# Patient Record
Sex: FEMALE | Race: WHITE | Employment: UNEMPLOYED | URBAN - METROPOLITAN AREA
[De-identification: names, ages, dates, MRNs, and addresses within clinical notes are randomized per-mention and may not be internally consistent; named-entity substitution may affect disease eponyms.]

---

## 2017-08-16 ENCOUNTER — OFFICE VISIT (OUTPATIENT)
Dept: INTERNAL MEDICINE CLINIC | Age: 22
End: 2017-08-16

## 2017-08-16 VITALS
WEIGHT: 134 LBS | HEIGHT: 67 IN | OXYGEN SATURATION: 99 % | RESPIRATION RATE: 16 BRPM | SYSTOLIC BLOOD PRESSURE: 115 MMHG | DIASTOLIC BLOOD PRESSURE: 75 MMHG | BODY MASS INDEX: 21.03 KG/M2 | HEART RATE: 75 BPM

## 2017-08-16 DIAGNOSIS — M76.892 HAMSTRING TENDONITIS OF LEFT THIGH: Primary | ICD-10-CM

## 2017-08-16 NOTE — MR AVS SNAPSHOT
Visit Information Date & Time Provider Department Dept. Phone Encounter #  
 8/16/2017  8:45 AM MD [facility] [place] [address] [id number] Follow-up Instructions Return in about 4 weeks (around 9/13/2017) for hamstring tendonitis. Upcoming Health Maintenance Date Due  
 HPV AGE 9Y-34Y (1 of 3 - Female 3 Dose Series) 9/21/2004 DTaP/Tdap/Td series (1 - Tdap) 9/21/2014 PAP AKA CERVICAL CYTOLOGY 9/21/2014 INFLUENZA AGE 9 TO ADULT 8/1/2017 Allergies as of 8/16/2017  Review Complete On: 8/16/2017 By: Sky Hanks MD  
  
 Severity Noted Reaction Type Reactions Amoxicillin  03/17/2016    Other (comments) As a baby Ceclor [Cefaclor]  03/17/2016    Other (comments) As a baby Penicillins  03/17/2016    Other (comments) As a baby Sulfa (Sulfonamide Antibiotics)  03/17/2016    Other (comments) As a baby Current Immunizations  Never Reviewed No immunizations on file. Not reviewed this visit You Were Diagnosed With   
  
 Codes Comments Hamstring tendonitis of left thigh    -  Primary ICD-10-CM: P80.778 ICD-9-CM: 727.09 Vitals BP Pulse Resp Height(growth percentile) Weight(growth percentile) SpO2  
 115/75 75 16 5' 7\" (1.702 m) 134 lb (60.8 kg) 99% BMI  
  
  
  
  
 20.99 kg/m2 Vitals History BMI and BSA Data Body Mass Index Body Surface Area  
 20.99 kg/m 2 1.7 m 2 Preferred Pharmacy Pharmacy Name Phone [facility] [address] [place] [address] [address] [phone] Your Updated Medication List  
  
Notice  As of 8/16/2017  9:48 AM  
 You have not been prescribed any medications. Follow-up Instructions Return in about 4 weeks (around 9/13/2017) for hamstring tendonitis. Introducing [facility]!    
 Ian Fraire introduces [employer] patient portal. Now you can access parts of your medical record, email your doctor's office, and request medication refills online. 1. In your internet browser, go to [email or web address] 2. Click on the First Time User? Click Here link in the Sign In box. You will see the New Member Sign Up page. 3. Enter your Onfan Access Code exactly as it appears below. You will not need to use this code after youve completed the sign-up process. If you do not sign up before the expiration date, you must request a new code. · Onfan Access Code: [email or web address] Expires: 11/14/2017  9:48 AM 
 
4. Enter the last four digits of your Social Security Number (xxxx) and Date of Birth (mm/dd/yyyy) as indicated and click Submit. You will be taken to the next sign-up page. 5. Create a Onfan ID. This will be your Onfan login ID and cannot be changed, so think of one that is secure and easy to remember. 6. Create a Onfan password. You can change your password at any time. 7. Enter your Password Reset Question and Answer. This can be used at a later time if you forget your password. 8. Enter your e-mail address. You will receive e-mail notification when new information is available in [address] 9. Click Sign Up. You can now view and download portions of your medical record. 10. Click the Download Summary menu link to download a portable copy of your medical information. If you have questions, please visit the Frequently Asked Questions section of the Onfan website. Remember, Onfan is NOT to be used for urgent needs. For medical emergencies, dial 911. Now available from your iPhone and Android! Please provide this summary of care documentation to your next provider. Your primary care clinician is listed as Scott Merino. If you have any questions after today's visit, please call [phone].

## 2017-08-16 NOTE — PROGRESS NOTES
Chief Complaint   Patient presents with    Leg Pain     Left Hamstring    Knee Pain     Left       she is a 19y.o. year old female who presents for evaluation of left hamstring and left knee pain. Would like to discuss injection. Performing squats at the gym with heavy weights, no pain or soreness during exercises, soreness begin the following day   Pain Assessment Encounter      Shady Carlos  8/16/2017  Onset of Symptoms: Two Weeks Ago  ________________________________________________________________________  Description: Soreness located Posterior Thigh    Frequency: 4-5 times a day  Pain Scale:(1-10): 0   Trauma Hx: None  Hx of similar symptoms: Yes  Radiation: knee  Duration:  continuous    Progression: is unchanged  What makes it better?: rest and massage  What makes it worse?:exercise, standing, stretching and walking  Medications tried: none, has tried physical therapy and home exercises in the past   Reviewed and agree with Nurse Note and duplicated in this note. Reviewed PmHx, RxHx, FmHx, SocHx, AllgHx and updated and dated in the chart. No family history on file. No past medical history on file.    Social History     Social History    Marital status: SINGLE     Spouse name: N/A    Number of children: N/A    Years of education: N/A     Social History Main Topics    Smoking status: Not on file    Smokeless tobacco: Not on file    Alcohol use Not on file    Drug use: Not on file    Sexual activity: Not on file     Other Topics Concern    Not on file     Social History Narrative        Review of Systems - negative except as listed above      Objective:     Vitals:    08/16/17 0905   BP: 115/75   Pulse: 75   Resp: 16   SpO2: 99%   Weight: 134 lb (60.8 kg)   Height: 5' 7\" (1.702 m)       Physical Examination: General appearance - alert, well appearing, and in no distress  Back exam - full range of motion, no tenderness, palpable spasm or pain on motion  Neurological - alert, oriented, normal speech, no focal findings or movement disorder noted  Musculoskeletal - Left Hamstring -pain with palpation distal to the origin of the hamstring tendon, no defect palpable, strength 5 out of 5 with flexion  Extremities - peripheral pulses normal, no pedal edema, no clubbing or cyanosis  Skin - normal coloration and turgor, no rashes, no suspicious skin lesions noted  Time Out taken at:  9:49 AM  8/16/2017    * Patient was identified by name and date of birth   * Agreement on procedure being performed was verified  * Risks and Benefits explained to the patient  * Procedure site verified and marked as necessary  * Patient was positioned for comfort  * Consent was signed and verified   In the presence of: Witness: JAVI Jay  Injection #: 1  Needle:  25 gauge  Procedure: This procedure was discussed with Frank Flanagan and other therapeutic options were considered (risks vs benefits). Frank Flanagan and I thought that an injection was merited. After informed consent was obtained, landmarks were identified(marked), and the left tendon  was cleansed with ChlorPrep in the standard sterile manner. 2mL  1% lidocaine  and  1mL D50  was then injected and needle tenotomy was not performed. Procedure performed with ultrasound needle guidance. The needle was then withdrawn. T he procedure was well tolerated. The patient is asked to continue to rest the area for a few more days before resuming regular activities. It may be more painful for the first 1-2 days. NSAIDS are to be avoided. Watch for fever, or increased swelling or persistent pain in the joint. Call or return to clinic prn if such symptoms occur or there is failure to improve as anticipated. The procedure did provide relief of symptoms in the clinic. RTC in 4 weeks for reevaluation and possible reinjection.      Given the patient's body habitus and the anatomically deep nature of this structure, sonographic guidance is recommended to prevent injury to neurovascular structures and confirm accuracy of injection. Furthermore, this patient has failed conservative treatment with physical therapy and modalities and the diagnostic and therapeutic accuracy is important. Assessment/ Plan:   Diagnoses and all orders for this visit:    1. Hamstring tendonitis of left thigh  -     20550 - INJECT TENDON SHEATH/LIGAMENT  -     AMB POC US, SONO GUIDE NEEDLE         Pathophysiology, recovery and rehabilitation process discussed and questions answered   Counseling for 30 Minutes of the total visit duration   Pictures and figures used as necessary   Provided reassurance   Monitor response to injection   Recommend activity modification   Recommend  lower impact activities-walking, Eliptical, Nordic Track, cycling or swimming   Follow up in 4 week(s)           I have discussed the diagnosis with the patient and the intended plan as seen in the above orders. The patient has received an after-visit summary and questions were answered concerning future plans. Medication Side Effects and Warnings were discussed with patient: yes  Patient Labs were reviewed and or requested: yes  Patient Past Records were reviewed and or requested  yes  I have discussed the diagnosis with the patient and the intended plan as seen in the above orders. The patient has received an after-visit summary and questions were answered concerning future plans. Pt agrees to call or return to clinic and/or go to closest ER with any worsening of symptoms. This may include, but not limited to increased fever (>100.4) with NSAIDS or Tylenol, increased edema, confusion, rash, worsening of presenting symptoms. 1) Remember to stay active and/or exercise regularly (I suggest 30-45 minutes daily)   2) For reliable dietary information, go to www. EATRIGHT.org. You may wish to consider seeing the nutritionist at [facility] at #[phone] or [phone], also consider the [address] diet.   3) I routinely suggest a complete physical exam once each year (your birth month)

## 2017-12-13 ENCOUNTER — OFFICE VISIT (OUTPATIENT)
Dept: INTERNAL MEDICINE CLINIC | Age: 22
End: 2017-12-13

## 2017-12-13 VITALS
TEMPERATURE: 97.5 F | HEART RATE: 74 BPM | OXYGEN SATURATION: 97 % | DIASTOLIC BLOOD PRESSURE: 73 MMHG | HEIGHT: 67 IN | BODY MASS INDEX: 21.22 KG/M2 | WEIGHT: 135.2 LBS | SYSTOLIC BLOOD PRESSURE: 110 MMHG

## 2017-12-13 DIAGNOSIS — Z71.84 TRAVEL ADVICE ENCOUNTER: Primary | ICD-10-CM

## 2017-12-13 RX ORDER — AZITHROMYCIN 250 MG/1
TABLET, FILM COATED ORAL
Qty: 6 TAB | Refills: 0 | Status: SHIPPED | OUTPATIENT
Start: 2017-12-13 | End: 2017-12-18

## 2017-12-13 RX ORDER — ATOVAQUONE AND PROGUANIL HYDROCHLORIDE 250; 100 MG/1; MG/1
TABLET, FILM COATED ORAL
Qty: 30 TAB | Refills: 0 | Status: SHIPPED | OUTPATIENT
Start: 2017-12-13

## 2017-12-13 NOTE — PATIENT INSTRUCTIONS
Learning About Healthy Travel Abroad  How can you stay healthy on your trip? The best way to stay healthy on your trip is to plan ahead. Talk with your doctor several months before you travel to another country. It's important to allow enough time to get the vaccine doses that you need. For example, if you need the hepatitis A vaccine, you'll need 2 doses spaced at least 6 months apart. Also ask your doctor if there are medicines or extra safety steps that you should take. Check with your local health department or travel health clinic for other travel tips. What can you do to prevent health problems? Get needed vaccines  · Make sure you are up to date with your routine shots. They can protect you from diseases such as polio, diphtheria, and measles. These diseases are still a problem in some developing countries. · Get other vaccines you need. Your doctor or a health clinic can tell you which ones you need for your travels. Here are some examples:  ¨ Hepatitis A vaccine, if you travel to developing countries. ¨ Yellow fever vaccine, if you visit places in [place] and [place] where the disease is active. ¨ Typhoid fever vaccine, if you travel to [facility] and [place], [place], or some areas of [place]. Bring medicines with you  · If you take medicines, bring a supply that will last the length of your trip. Get a letter from your doctor that lists your medical conditions and the medicines you take. Bring prescriptions for refills if you will be gone for a long time. Also bring any medical supplies you may need such as blood sugar testing supplies or insulin needles. · If you are going to an area where malaria is a risk, ask your doctor or health clinic for a prescription to help prevent infection. This medicine works best if you take it before, during, and after your trip. · You may want to bring medicine for traveler's diarrhea.  Over-the-counter medicines include:  ¨ Bismuth subsalicylate (Pepto-Bismol). ¨ Loperamide (Imodium). Your doctor may also prescribe an antibiotic to take with you. This can treat diarrhea if you're going to an area where modern medical care isn't readily available. Make safer choices as you travel  · Practice safer sex. Using condoms can prevent sexually transmitted infections. · In malaria-infected areas, use DEET insect repellent. Wear long pants and long-sleeved shirts, especially from dusk to [place]. Use mosquito netting to protect yourself from bites while you sleep. · Many developing countries don't have safe tap water. Only have drinks made with boiled water, such as tea and coffee. Canned or bottled carbonated drinks, such as soda, beer, wine, or water, are usually safe. Don't use ice if you don't know what kind of water was used to make it. And don't use tap water to brush your teeth. · Be aware that you could be injured in cars, boats, or public transportation. Driving can be dangerous due to bad roads, poor  training, and crowded roadways. If you hire a  or taxi, ask the  to slow down or drive more carefully if you feel unsafe. · Air pollution in some large cities can be a problem if you have asthma or other breathing problems. Avoid those cities when air quality is poor. Or stay indoors as much as possible. · Be careful around dogs and other animals. Dogs in developing countries are often not tame and may bite. Rabies is more common in tropical and subtropical regions. · If you're going to a place that's much higher above sea level than you're used to, ask your doctor how to avoid altitude sickness. He or she may also prescribe medicine to help treat it. Where can you get the best information? · Use the Internet to find travel health information. Try these websites:  ¨ www.cdc.gov/travel. This website is for the Centers for Disease Control and Prevention (CDC). ¨ www.who.int/ith/en.  This website lists information from the [address] [facility] Organization (WHO) on travel, required immunizations, and disease outbreaks. · Find out where you can get the best medical care in the region you are visiting. See the [address] website at www.Toonimo.gov. It lists every [place] embassy worldwide. It also lists some doctors and medical facilities in those countries. · Take along the phone numbers and addresses of embassies in the areas you will visit. They can help you find a doctor or hospital. Find out if your insurance company will cover you. You may want to get special travel health insurance. · If you are taking a cruise, you can find your ship's health record on this website: www.cdc.gov/nceh/vsp. Where can you learn more? Go to http://robert-umang.info/. Enter R129 in the search box to learn more about \"Learning About Healthy Travel Abroad. \"  Current as of: July 29, 2016  Content Version: 11.4  © 3343-4251 Healthwise, Incorporated. Care instructions adapted under license by [employer] (which disclaims liability or warranty for this information). If you have questions about a medical condition or this instruction, always ask your healthcare professional. [address] any warranty or liability for your use of this information.

## 2017-12-13 NOTE — PROGRESS NOTES
Chief Complaint   Patient presents with    Immunization/Injection     pt states she is going to [facility] in 1/2018 and needs some vaccinations, but unsure of which kind. she is a 23y.o. year old female who presents for evaluation of vaccinations, pt states she is going to [facility] in January 2018 and needs vaccinations but unsure of which ones. Patient is going from dates January 9-23. She is unaware of her vaccination history and we are unable to obtain it today. Reviewed and agree with Nurse Note and duplicated in this note. Reviewed PmHx, RxHx, FmHx, SocHx, AllgHx and updated and dated in the chart. No family history on file. No past medical history on file.    Social History     Social History    Marital status: SINGLE     Spouse name: N/A    Number of children: N/A    Years of education: N/A     Social History Main Topics    Smoking status: Not on file    Smokeless tobacco: Not on file    Alcohol use Not on file    Drug use: Not on file    Sexual activity: Not on file     Other Topics Concern    Not on file     Social History Narrative        Review of Systems - negative except as listed above      Objective:     Vitals:    12/13/17 1528   BP: 110/73   Pulse: 74   Temp: 97.5 °F (36.4 °C)   TempSrc: Oral   SpO2: 97%   Weight: 135 lb 3.2 oz (61.3 kg)   Height: 5' 7\" (1.702 m)       Physical Examination: General appearance - alert, well appearing, and in no distress  Eyes - pupils equal and reactive, extraocular eye movements intact  Ears - bilateral TM's and external ear canals normal  Nose - normal and patent, no erythema, discharge or polyps  Mouth - mucous membranes moist, pharynx normal without lesions  Neck - supple, no significant adenopathy  Chest - clear to auscultation, no wheezes, rales or rhonchi, symmetric air entry  Heart - normal rate, regular rhythm, normal S1, S2, no murmurs, rubs, clicks or gallops  Abdomen - soft, nontender, nondistended, no masses or organomegaly  Neurological - alert, oriented, normal speech, no focal findings or movement disorder noted  Musculoskeletal - no joint tenderness, deformity or swelling  Extremities - peripheral pulses normal, no pedal edema, no clubbing or cyanosis  Skin - normal coloration and turgor, no rashes, no suspicious skin lesions noted    Assessment/ Plan:   Diagnoses and all orders for this visit:    1. Travel advice encounter    Other orders  -     Yellow Fever Vaccine, PF, (YF-VAX, PF,) 10 exp4.74 unit/0.5 mL susr; One dose (0.5 mL) =10 days before travel  -     typhoid vi polysacch vaccine (TYPHIM VI) 25 mcg/0.5 mL injection; 0.5 mL given at least 2 weeks prior to expected exposure  -     azithromycin (ZITHROMAX) 250 mg tablet; Take 4 tablets all at once for traveler's diarrhea  -     atovaquone-proguanil (MALARONE) 250-100 mg per tablet; Start 1-2 days prior to entering a malaria-endemic area, continue throughout the stay and for 7 days after returning. Take as a single dose, once daily    In addition to these vaccines she will likely need her hepatitis A series and possibly hepatitis B pending her vaccine records  Follow-up Disposition:  Return if symptoms worsen or fail to improve. 1) Remember to stay active and/or exercise regularly (I suggest 30-45 minutes daily)   2) For reliable dietary information, go to www. EATRIGHT.org. You may wish to consider seeing the nutritionist at [facility] [phone], also consider the [address] 3) I routinely suggest a complete physical exam once each year (your birth month)  I have discussed the diagnosis with the patient and the intended plan as seen in the above orders. The patient has received an after-visit summary and questions were answered concerning future plans.      Medication Side Effects and Warnings were discussed with patient: yes  Patient Labs were reviewed and or requested: yes  Patient Past Records were reviewed and or requested  yes  I have discussed the diagnosis with the patient and the intended plan as seen in the above orders. Pt agrees to call or return to clinic and/or go to closest ER with any worsening of symptoms. This may include, but not limited to increased fever (>100.4) with NSAIDS or Tylenol, increased edema, confusion, rash, worsening of presenting symptoms.

## 2017-12-13 NOTE — MR AVS SNAPSHOT
Visit Information Date & Time Provider Department Dept. Phone Encounter #  
 12/13/2017  2:45 PM Kirill Rabago MD [facility] [phone] [id number] Upcoming Health Maintenance Date Due  
 HPV AGE 9Y-34Y (1 of 3 - Female 3 Dose Series) 9/21/2004 DTaP/Tdap/Td series (1 - Tdap) 9/21/2014 PAP AKA CERVICAL CYTOLOGY 9/21/2014 Influenza Age 5 to Adult 8/1/2017 Allergies as of 12/13/2017  Review Complete On: 12/13/2017 By: Karla Regina Severity Noted Reaction Type Reactions Amoxicillin  03/17/2016    Other (comments) As a baby Ceclor [Cefaclor]  03/17/2016    Other (comments) As a baby Penicillins  03/17/2016    Other (comments) As a baby Sulfa (Sulfonamide Antibiotics)  03/17/2016    Other (comments) As a baby Current Immunizations  Never Reviewed No immunizations on file. Not reviewed this visit You Were Diagnosed With   
  
 Codes Comments Travel advice encounter    -  Primary ICD-10-CM: Z71.89 ICD-9-CM: V65.49 Vitals BP Pulse Temp Height(growth percentile) Weight(growth percentile) LMP  
 110/73 (BP 1 Location: Right arm, BP Patient Position: Sitting) 74 97.5 °F (36.4 °C) (Oral) 5' 7\" (1.702 m) 135 lb 3.2 oz (61.3 kg) 12/03/2017 SpO2 BMI OB Status 97% 21.18 kg/m2 Having regular periods Vitals History BMI and BSA Data Body Mass Index Body Surface Area  
 21.18 kg/m 2 1.7 m 2 Preferred Pharmacy Pharmacy Name Phone [facility] [address] [place], [place] - [address] [phone] Your Updated Medication List  
  
   
This list is accurate as of: 12/13/17  4:20 PM.  Always use your most recent med list.  
  
  
  
  
 atovaquone-proguanil 250-100 mg per tablet Commonly known as:  MALARONE Start 1-2 days prior to entering a malaria-endemic area, continue throughout the stay and for 7 days after returning.  Take as a single dose, once daily  
  
 azithromycin 250 mg tablet Commonly known as:  Nona Norris Take 4 tablets all at once for traveler's diarrhea  
  
 typhoid vi polysacch vaccine 25 mcg/0.5 mL injection Commonly known as:  TYPHIM VI  
0.5 mL given at least 2 weeks prior to expected exposure Yellow Fever Vaccine (PF) 10 exp4.74 unit/0.5 mL Susr Commonly known as:  YF-VAX (PF) One dose (0.5 mL) =10 days before travel Prescriptions Printed Refills Yellow Fever Vaccine, PF, (YF-VAX, PF,) 10 exp4.74 unit/0.5 mL susr 0 Sig: One dose (0.5 mL) =10 days before travel Class: Print  
 typhoid vi polysacch vaccine (TYPHIM VI) 25 mcg/0.5 mL injection 0 Si.5 mL given at least 2 weeks prior to expected exposure Class: Print  
 azithromycin (ZITHROMAX) 250 mg tablet 0 Sig: Take 4 tablets all at once for traveler's diarrhea Class: Print  
 atovaquone-proguanil (MALARONE) 250-100 mg per tablet 0 Sig: Start 1-2 days prior to entering a malaria-endemic area, continue throughout the stay and for 7 days after returning. Take as a single dose, once daily Class: Print Introducing [facility]! Rut Roberts introduces [employer] patient portal. Now you can access parts of your medical record, email your doctor's office, and request medication refills online. 1. In your internet browser, go to [email or web address] 2. Click on the First Time User? Click Here link in the Sign In box. You will see the New Member Sign Up page. 3. Enter your [employer] Access Code exactly as it appears below. You will not need to use this code after youve completed the sign-up process. If you do not sign up before the expiration date, you must request a new code. · [employer] Access Code: [email or web address] Expires: 3/13/2018  3:40 PM 
 
4. Enter the last four digits of your Social Security Number (xxxx) and Date of Birth (mm/dd/yyyy) as indicated and click Submit.  You will be taken to the next sign-up page. 5. Create a [employer] Services ID. This will be your [employer] login ID and cannot be changed, so think of one that is secure and easy to remember. 6. Create a [employer] password. You can change your password at any time. 7. Enter your Password Reset Question and Answer. This can be used at a later time if you forget your password. 8. Enter your e-mail address. You will receive e-mail notification when new information is available in [address] 9. Click Sign Up. You can now view and download portions of your medical record. 10. Click the Download Summary menu link to download a portable copy of your medical information. If you have questions, please visit the Frequently Asked Questions section of the [employer] website. Remember, [employer] is NOT to be used for urgent needs. For medical emergencies, dial 911. Now available from your iPhone and Android! Please provide this summary of care documentation to your next provider. Your primary care clinician is listed as Sonali Villegas. If you have any questions after today's visit, please call [phone].

## 2017-12-19 ENCOUNTER — TELEPHONE (OUTPATIENT)
Dept: INTERNAL MEDICINE CLINIC | Age: 22
End: 2017-12-19

## 2017-12-19 NOTE — TELEPHONE ENCOUNTER
Patient would like to know if Dr. Anne Foster received her immunization records yet. Her number is [phone] she is waiting.

## 2024-11-06 ENCOUNTER — TELEPHONE (OUTPATIENT)
Dept: OBSTETRICS AND GYNECOLOGY | Age: 29
End: 2024-11-06
Payer: COMMERCIAL

## 2024-11-06 NOTE — TELEPHONE ENCOUNTER
Called patient to abstract chart on past medical history, surgery , family history etc.  Pt will call back once off work

## 2024-11-08 ENCOUNTER — OFFICE VISIT (OUTPATIENT)
Dept: OBSTETRICS AND GYNECOLOGY | Age: 29
End: 2024-11-08
Payer: COMMERCIAL

## 2024-11-08 VITALS
WEIGHT: 136 LBS | BODY MASS INDEX: 21.35 KG/M2 | HEIGHT: 67 IN | SYSTOLIC BLOOD PRESSURE: 124 MMHG | DIASTOLIC BLOOD PRESSURE: 68 MMHG

## 2024-11-08 DIAGNOSIS — Z12.4 CERVICAL CANCER SCREENING: ICD-10-CM

## 2024-11-08 DIAGNOSIS — Z30.432 ENCOUNTER FOR REMOVAL OF INTRAUTERINE CONTRACEPTIVE DEVICE (IUD): ICD-10-CM

## 2024-11-08 DIAGNOSIS — N92.6 IRREGULAR MENSES: ICD-10-CM

## 2024-11-08 DIAGNOSIS — N32.89: Primary | ICD-10-CM

## 2024-11-08 RX ORDER — COPPER 313.4 MG/1
1 INTRAUTERINE DEVICE INTRAUTERINE ONCE
COMMUNITY

## 2024-11-08 NOTE — PROGRESS NOTES
"[facility]   Obstetrics and Gynecology   New Gynecology Visit    2024    Patient: Vaishali Keyes          MR#:[id number]    History of Present Illness    Chief Complaint   Patient presents with    Gynecologic Exam     CC: new gyn. AUB. Last pap unknown. Pt has para guard IUD. Hx of misplaced IUD.       29 y.o. female  who presents for irregular bleeding. Most recent cycle lasted 20 days which is abnormal for her. In addition, ultrasound showed that IUD had migrated into lower uterine segment.     Studies reviewed: US Non-ob Transvaginal (2024 13:17)       Obstetric History:  OB History          0    Para   0    Term   0       0    AB   0    Living   0         SAB   0    IAB   0    Ectopic   0    Molar   0    Multiple   0    Live Births   0               Menstrual History:     Patient's last menstrual period was 10/23/2024 (exact date).              ________________________________________  Patient Active Problem List   Diagnosis    Irregular menses    Bladder lump     History reviewed. No pertinent past medical history.  History reviewed. No pertinent surgical history.  Social History     Tobacco Use   Smoking Status Never   Smokeless Tobacco Never     Family History   Problem Relation Age of Onset    Heart attack Paternal Grandfather     Breast cancer Maternal Aunt      Prior to Admission medications    Medication Sig Start Date End Date Taking? Authorizing Provider   Paragard Intrauterine Copper intrauterine device IUD To be inserted one time by prescriber. Route intrauterine.     Yes Provider, [place], MD     ________________________________________    The following portions of the patient's history were reviewed and updated as appropriate: allergies, current medications, past family history, past medical history, past social history, past surgical history, and problem list.           Objective     /68   Ht 170.2 cm (67\")   Wt 61.7 kg (136 lb)   LMP " "10/23/2024 (Exact Date)   BMI 21.30 kg/m²    BP Readings from Last 3 Encounters:   11/08/24 124/68      Wt Readings from Last 3 Encounters:   11/08/24 61.7 kg (136 lb)        BMI: Estimated body mass index is 21.3 kg/m² as calculated from the following:    Height as of this encounter: 170.2 cm (67\").    Weight as of this encounter: 61.7 kg (136 lb).    Physical Exam  Vitals and nursing note reviewed. Exam conducted with a chaperone present.   Constitutional:       Appearance: Normal appearance.   HENT:      Head: Normocephalic and atraumatic.   Pulmonary:      Effort: Pulmonary effort is normal.   Abdominal:      General: Abdomen is flat.      Palpations: Abdomen is soft.   Genitourinary:     Exam position: Lithotomy position.      Labia:         Right: No rash or lesion.         Left: No rash or lesion.       Vagina: Normal. No vaginal discharge or lesions.      Cervix: Normal. No lesion.      Uterus: Normal. Not tender.       Adnexa: Right adnexa normal and left adnexa normal.        Right: No mass or tenderness.          Left: No mass or tenderness.     Skin:     General: Skin is warm and dry.   Neurological:      Mental Status: She is alert and oriented to person, place, and time.   Psychiatric:         Mood and Affect: Mood normal.           IUD Removal Procedure Note    Type of IUD:  ParaGard  Date of insertion:  2 years ago  Reason for removal:   migration into lower uterine segment    Procedure Time Documentation  The risks of the procedure were reviewed with the patient including bleeding, infection and unlikely damage to the uterus and the benefits of the procedure were explained to the patient and written informed consent was obtained.  Time out was performed.    Procedure Details  IUD strings visible:  yes  Removal:  IUD strings grasped and IUD removed intact with gentle traction.  The patient tolerated the procedure well.    All appropriate instructions regarding removal were reviewed.    Patient " tolerated the procedure well.    Plans for contraception:  no method  Follow up PRN  Patient was advised to call for fever, prolonged/severe pain, or prolonged bleeding  She was advised to use NSAIDs for mild to moderate pain    Procedure time: 7 minutes    Shena Oconnell MD  11/08/24  14:30 EST            Assessment:  Diagnoses and all orders for this visit:    1. Bladder lump (Primary)  Overview:  incidental finding of vascular nodule on bladder during TVUS today.   Patient states has had frequency of urination at baseline and difficulty with bladder emptying.   Referral made to Urology.     Orders:  -     Ambulatory Referral to Urology    2. Cervical cancer screening  -     IGP, Apt HPV,rfx 16 / 18,45    3. Irregular menses  Comments:  - Possibly secondary to displaced IUD   - Pt unsure of what BC she would like.   - Will call office if she desires BC or cycles continue to be irregular.    4. Encounter for removal of intrauterine contraceptive device (IUD)          Plan:  Return in about 1 year (around 11/8/2025).      Shena Oconnell MD  11/8/2024 14:30 EST

## 2024-11-12 ENCOUNTER — TELEPHONE (OUTPATIENT)
Dept: OBSTETRICS AND GYNECOLOGY | Age: 29
End: 2024-11-12
Payer: COMMERCIAL

## 2024-11-12 NOTE — TELEPHONE ENCOUNTER
Pt calling stating a referral was placed to urology on 11/8/24 with Dr. Ya Disla. Pt contacted their office & they are stating they have not received a referral. Please advise.

## 2024-11-18 LAB
CYTOLOGIST CVX/VAG CYTO: NORMAL
CYTOLOGY CVX/VAG DOC CYTO: NORMAL
CYTOLOGY CVX/VAG DOC THIN PREP: NORMAL
DX ICD CODE: NORMAL
HPV I/H RISK 4 DNA CVX QL PROBE+SIG AMP: NEGATIVE
Lab: NORMAL
OTHER STN SPEC: NORMAL
STAT OF ADQ CVX/VAG CYTO-IMP: NORMAL

## 2025-01-17 NOTE — TELEPHONE ENCOUNTER
Referral was sent and I have confirmation that it went through. I have sent it again to make sure they get it.   No